# Patient Record
Sex: FEMALE | Race: WHITE | Employment: OTHER | URBAN - METROPOLITAN AREA
[De-identification: names, ages, dates, MRNs, and addresses within clinical notes are randomized per-mention and may not be internally consistent; named-entity substitution may affect disease eponyms.]

---

## 2020-02-07 ENCOUNTER — HOSPITAL ENCOUNTER (EMERGENCY)
Age: 65
Discharge: HOME | End: 2020-02-07
Attending: EMERGENCY MEDICINE
Payer: MEDICARE

## 2020-02-07 ENCOUNTER — APPOINTMENT (OUTPATIENT)
Dept: CT IMAGING | Age: 65
End: 2020-02-07
Payer: MEDICARE

## 2020-02-07 VITALS
HEIGHT: 66 IN | HEART RATE: 69 BPM | OXYGEN SATURATION: 95 % | RESPIRATION RATE: 18 BRPM | BODY MASS INDEX: 24.11 KG/M2 | TEMPERATURE: 98.2 F | DIASTOLIC BLOOD PRESSURE: 63 MMHG | WEIGHT: 150 LBS | SYSTOLIC BLOOD PRESSURE: 125 MMHG

## 2020-02-07 PROCEDURE — 99283 EMERGENCY DEPT VISIT LOW MDM: CPT

## 2020-02-07 PROCEDURE — 70450 CT HEAD/BRAIN W/O DYE: CPT

## 2020-02-07 PROCEDURE — 2500000003 HC RX 250 WO HCPCS: Performed by: PHYSICIAN ASSISTANT

## 2020-02-07 PROCEDURE — 70486 CT MAXILLOFACIAL W/O DYE: CPT

## 2020-02-07 PROCEDURE — 12013 RPR F/E/E/N/L/M 2.6-5.0 CM: CPT

## 2020-02-07 PROCEDURE — 72125 CT NECK SPINE W/O DYE: CPT

## 2020-02-07 PROCEDURE — 6370000000 HC RX 637 (ALT 250 FOR IP): Performed by: PHYSICIAN ASSISTANT

## 2020-02-07 RX ORDER — CEPHALEXIN 500 MG/1
500 CAPSULE ORAL 3 TIMES DAILY
Qty: 29 CAPSULE | Refills: 0 | Status: SHIPPED | OUTPATIENT
Start: 2020-02-07 | End: 2020-02-17

## 2020-02-07 RX ORDER — CEPHALEXIN 250 MG/1
500 CAPSULE ORAL ONCE
Status: COMPLETED | OUTPATIENT
Start: 2020-02-07 | End: 2020-02-07

## 2020-02-07 RX ORDER — ONDANSETRON 4 MG/1
4 TABLET, FILM COATED ORAL ONCE
Status: COMPLETED | OUTPATIENT
Start: 2020-02-07 | End: 2020-02-07

## 2020-02-07 RX ORDER — FLUOXETINE HYDROCHLORIDE 20 MG/1
CAPSULE ORAL
COMMUNITY

## 2020-02-07 RX ORDER — GINSENG 100 MG
CAPSULE ORAL ONCE
Status: COMPLETED | OUTPATIENT
Start: 2020-02-07 | End: 2020-02-07

## 2020-02-07 RX ORDER — LIDOCAINE HYDROCHLORIDE AND EPINEPHRINE 10; 10 MG/ML; UG/ML
20 INJECTION, SOLUTION INFILTRATION; PERINEURAL ONCE
Status: COMPLETED | OUTPATIENT
Start: 2020-02-07 | End: 2020-02-07

## 2020-02-07 RX ADMIN — LIDOCAINE HYDROCHLORIDE AND EPINEPHRINE 20 ML: 10; 10 INJECTION, SOLUTION INFILTRATION; PERINEURAL at 19:25

## 2020-02-07 RX ADMIN — ONDANSETRON HYDROCHLORIDE 4 MG: 4 TABLET, FILM COATED ORAL at 18:12

## 2020-02-07 RX ADMIN — BACITRACIN: 500 OINTMENT TOPICAL at 19:36

## 2020-02-07 RX ADMIN — CEPHALEXIN 500 MG: 250 CAPSULE ORAL at 19:37

## 2020-02-07 ASSESSMENT — ENCOUNTER SYMPTOMS
EYE REDNESS: 0
NAUSEA: 1
BACK PAIN: 0
SORE THROAT: 0
COUGH: 0
ABDOMINAL PAIN: 0
VOMITING: 0
SHORTNESS OF BREATH: 0
EYE DISCHARGE: 0

## 2020-02-07 ASSESSMENT — PAIN DESCRIPTION - PAIN TYPE: TYPE: ACUTE PAIN

## 2020-02-07 ASSESSMENT — PAIN DESCRIPTION - FREQUENCY: FREQUENCY: CONTINUOUS

## 2020-02-07 ASSESSMENT — PAIN SCALES - GENERAL
PAINLEVEL_OUTOF10: 4
PAINLEVEL_OUTOF10: 4

## 2020-02-07 ASSESSMENT — PAIN DESCRIPTION - LOCATION: LOCATION: HEAD

## 2020-02-07 ASSESSMENT — PAIN DESCRIPTION - DESCRIPTORS: DESCRIPTORS: SORE

## 2020-02-07 NOTE — ED PROVIDER NOTES
slow venous oozing. Neurological:      General: No focal deficit present. Mental Status: She is alert and oriented to person, place, and time. Cranial Nerves: Cranial nerves are intact. Sensory: Sensation is intact. Motor: Motor function is intact. Coordination: Finger-Nose-Finger Test normal.      Gait: Gait is intact. Psychiatric:         Mood and Affect: Mood normal.         Behavior: Behavior normal.       DIAGNOSTIC RESULTS     RADIOLOGY:   Radiology images were visualized by myself. The Radiologist interpretations were reviewed and are as follows:     CT HEAD WO CONTRAST (Final result)   Result time 02/07/20 18:44:38   Final result by Nannette Daniels DO (02/07/20 18:44:38)                Impression:    1. No acute intraparenchymal hemorrhage, intra-axial mass, or evidence of an  acute territorial infarct  2. Soft tissue laceration, within the forehead region, with a 4-5 mm  radiopaque foreign body seen within the region of the laceration. Narrative:    EXAMINATION:  CT OF THE HEAD WITHOUT CONTRAST  2/7/2020 6:30 pm    TECHNIQUE:  CT of the head was performed without the administration of intravenous  contrast. Dose modulation, iterative reconstruction, and/or weight based  adjustment of the mA/kV was utilized to reduce the radiation dose to as low  as reasonably achievable. COMPARISON:  None. HISTORY:  ORDERING SYSTEM PROVIDED HISTORY: fall; hit face on the ground; no LOC  TECHNOLOGIST PROVIDED HISTORY:  fall; hit face on the ground; no LOC    Reason for Exam: Pt fell today, multiple facial lacerations  Acuity: Acute  Type of Exam: Initial  Mechanism of Injury: fall today    FINDINGS:  BRAIN/VENTRICLES: There is no acute intracranial hemorrhage, mass effect or  midline shift.  No abnormal extra-axial fluid collection.  The gray-white  differentiation is maintained without evidence of an acute infarct.  There is  no evidence of hydrocephalus.     ORBITS: The visualized portion of the orbits demonstrate no acute abnormality. SINUSES: The visualized paranasal sinuses and mastoid air cells demonstrate  no acute abnormality. SOFT TISSUES/SKULL:  Soft tissue laceration is seen within the forehead  region, slightly left of midline, within the 4-5 mm radiopaque density seen  within the laceration, consistent with a foreign body.                    CT CERVICAL SPINE WO CONTRAST (Final result)   Result time 02/07/20 18:41:22   Final result by Michelle Odom DO (02/07/20 18:41:22)                Impression:    No acute abnormality of the cervical spine. Narrative:    EXAMINATION:  CT OF THE CERVICAL SPINE WITHOUT CONTRAST 2/7/2020 6:30 pm    TECHNIQUE:  CT of the cervical spine was performed without the administration of  intravenous contrast. Multiplanar reformatted images are provided for review. Dose modulation, iterative reconstruction, and/or weight based adjustment of  the mA/kV was utilized to reduce the radiation dose to as low as reasonably  achievable. COMPARISON:  None. HISTORY:  ORDERING SYSTEM PROVIDED HISTORY: fall; hit face on the ground  TECHNOLOGIST PROVIDED HISTORY:  fall; hit face on the ground  Reason for Exam: Pt fell today, multiple facial lacerations  Acuity: Acute  Type of Exam: Initial  Mechanism of Injury: fall today    FINDINGS:  BONES/ALIGNMENT: There is no acute fracture or traumatic malalignment. DEGENERATIVE CHANGES: Cervical spondylosis and disc space narrowing is  present at the C6-C7 disc level, without significant central spinal stenosis    SOFT TISSUES: There is no prevertebral soft tissue swelling.                    CT FACIAL BONES WO CONTRAST (Final result)   Result time 02/07/20 18:56:36   Final result by Suzette Rolon MD (02/07/20 18:56:36)                Impression:    No acute fracture. Skin injury identified forehead/nasal bridge with 3 mm radiopaque foreign  body.             Narrative:    EXAMINATION:  CT OF up-to-date. Given the facial injuries and the fact that the patient hit her head, I will obtain CT scan imaging of the head and cervical spine. I will also obtain a CT scan of the facial bones. Patient is feeling slightly nauseous. Acute nausea will be treated with oral Zofran. EMERGENCY DEPARTMENT COURSE:   Vitals:    Vitals:    02/07/20 1748   BP: (!) 144/85   Pulse: 70   Resp: 16   Temp: 98.2 °F (36.8 °C)   TempSrc: Oral   SpO2: 98%   Weight: 68 kg (150 lb)   Height: 5' 6\" (1.676 m)     -------------------------  BP: (!) 144/85, Temp: 98.2 °F (36.8 °C), Pulse: 70, Resp: 16    The patient was given the following medications:  Orders Placed This Encounter   Medications    ondansetron (ZOFRAN) tablet 4 mg    lidocaine-EPINEPHrine 1 percent-1:020485 injection 20 mL    bacitracin ointment    cephALEXin (KEFLEX) capsule 500 mg    cephALEXin (KEFLEX) 500 MG capsule     Sig: Take 1 capsule by mouth 3 times daily for 10 days     Dispense:  29 capsule     Refill:  0     First dose given in the ER. PROCEDURES  Laceration Repair:  Verbal consent was obtained from from the patient. Laceration description (see physical examination). The laceration was prepped with Betadine and draped in sterile fashion. Anesthesia was achieved with 3.0 cc of percent lidocaine with epinephrine. The wound was irrigated with 120 cc of normal saline and explored. A 3 mm flat gray bubble was removed from the laceration site. The wound edges were reapproximated using 5 simple interrupted 6-0 Ethilon sutures. The patient tolerated the procedure well. No complications. Wound care instructions were discussed with the patient. Antibiotic cream and a dressing were applied to the laceration. Re-evaluation Notes  7:33 PM.  Results of the CT scans were discussed with the patient by myself. The 3 mm foreign body seen in the laceration was removed. Head CT is showing no acute intercranial abnormality.   CT facial bones is showing no evidence of acute fracture. CT scan is showing no acute fracture. Patient will be placed on Keflex. I will provide her with head injury instructions. She should have a wound recheck with her primary care doctor in the next 2 to 3 days. Suture removal in 6 to 7 days. FINAL IMPRESSION      1. Facial laceration, initial encounter    2. Facial abrasion, initial encounter    3.  Head injury, closed, without LOC, initial encounter        DISPOSITION/PLAN   DISPOSITION Discharge - Pending Orders Complete 02/07/2020 07:22:57 PM    Condition on Disposition  Stable    PATIENT REFERRED TO:  Dyana Watts MD  [address] [facility] [address]  [phone]    Schedule an appointment as soon as possible for a visit   For wound re-check in 3-4 days    Dyana Watts MD  [address] [facility] [address]  [phone]    Schedule an appointment as soon as possible for a visit   For suture removal in 6-7 days    DISCHARGE MEDICATIONS:  New Prescriptions    CEPHALEXIN (KEFLEX) 500 MG CAPSULE    Take 1 capsule by mouth 3 times daily for 10 days     (Please note that portions of this note were completed with a voice recognition program.  Efforts were made to edit the dictations but occasionally words are mis-transcribed.)    Hieu Cha PA-C  02/07/20 1958

## 2020-02-07 NOTE — ED PROVIDER NOTES
[id number] [facility]  [address] [facility] [address]  Phone: [phone]  Fax: [phone]      Attending Physician Attestation    I performed a history and physical examination of the patient and discussed management with the mid level provider. I reviewed the mid level provider's note and agree with the documented findings and plan of care. Any areas of disagreement are noted on the chart. I was personally present for the key portions of any procedures. I have documented in the chart those procedures where I was not present during the key portions. I have reviewed the emergency nurses triage note. I agree with the chief complaint, past medical history, past surgical history, allergies, medications, social and family history as documented unless otherwise noted below. Documentation of the HPI, Physical Exam and Medical Decision Making performed by mid level providers is based on my personal performance of the HPI, PE and MDM. For Physician Assistant/ Nurse Practitioner cases/documentation I have personally evaluated this patient and have completed at least one if not all key elements of the E/M (history, physical exam, and MDM). Additional findings are as noted. CHIEF COMPLAINT     No chief complaint on file. HISTORY OF PRESENT ILLNESS    Leandro Irving is a 75 y.o. female who presents after a fall, hit head with laceration to forehead, no other pain, no numbness or weakness, no visual or hearing changes, nausea but no vomiting. Injury occurred just pta      PAST MEDICAL HISTORY    has a past medical history of Depression. SURGICAL HISTORY      has no past surgical history on file. CURRENT MEDICATIONS       Previous Medications    ALENDRONATE SODIUM (FOSAMAX PO)    Take by mouth    FLUOXETINE HCL, PMDD, 20 MG CAPS    Take by mouth       ALLERGIES     has no allergies on file. FAMILY HISTORY     has no family status information on file.       family history is not on file. SOCIAL HISTORY      reports that she has never smoked. She has never used smokeless tobacco. She reports current alcohol use. She reports that she does not use drugs. PHYSICAL EXAM     INITIAL VITALS:  height is 5' 6\" (1.676 m) and weight is 68 kg (150 lb). Her oral temperature is 98.2 °F (36.8 °C). Her blood pressure is 144/85 (abnormal) and her pulse is 70. Her respiration is 16 and oxygen saturation is 98%. The patient is noted to have a laceration to the bridge of the nose there is an abrasion of the left side of the cheek as well as the left lower jaw the pupils are equal round reactive to light no hemotympanum appreciated neck no overt posterior neck pain appreciated chest metrical nontender back symmetrical nontender abdomen soft nontender extremities warm dry good pulses motor sensation throughout no bony point tenderness appreciated in any of the extremities skin we have a laceration of the bridge of the nose abrasions in the facial region otherwise up for the rash or lesions neurologic GCS is 15 and no focal deficits are appreciated      DIAGNOSTIC RESULTS         RADIOLOGY:   Non-plain film images such as CT, Ultrasound and MRI are read by the radiologist. Kassi Cunningham radiographic images are visualized and the radiologist interpretations are reviewed as follows:     CT HEAD WO CONTRAST (Final result)   Result time 02/07/20 18:44:38   Final result by Lauri Weinberg DO (02/07/20 18:44:38)                Impression:    1. No acute intraparenchymal hemorrhage, intra-axial mass, or evidence of an  acute territorial infarct  2. Soft tissue laceration, within the forehead region, with a 4-5 mm  radiopaque foreign body seen within the region of the laceration.             Narrative:    EXAMINATION:  CT OF THE HEAD WITHOUT CONTRAST  2/7/2020 6:30 pm    TECHNIQUE:  CT of the head was performed without the administration of intravenous  contrast. Dose modulation, iterative reconstruction, and/or facial lacerations  Acuity: Acute  Type of Exam: Initial  Mechanism of Injury: fall today    FINDINGS:  BONES/ALIGNMENT: There is no acute fracture or traumatic malalignment. DEGENERATIVE CHANGES: Cervical spondylosis and disc space narrowing is  present at the C6-C7 disc level, without significant central spinal stenosis    SOFT TISSUES: There is no prevertebral soft tissue swelling.                    CT FACIAL BONES WO CONTRAST (Final result)   Result time 02/07/20 18:56:36   Final result by Ade Nieto MD (02/07/20 18:56:36)                Impression:    No acute fracture. Skin injury identified forehead/nasal bridge with 3 mm radiopaque foreign  body. Narrative:    EXAMINATION:  CT OF THE FACE WITHOUT CONTRAST  2/7/2020 6:30 pm    TECHNIQUE:  CT of the face was performed without the administration of intravenous  contrast. Multiplanar reformatted images are provided for review. Dose  modulation, iterative reconstruction, and/or weight based adjustment of the  mA/kV was utilized to reduce the radiation dose to as low as reasonably  achievable. COMPARISON:  None    HISTORY:  ORDERING SYSTEM PROVIDED HISTORY: fall; facial injury  TECHNOLOGIST PROVIDED HISTORY:  fall; facial injury  Reason for Exam: Pt fell today, multiple facial lacerations  Acuity: Acute  Type of Exam: Initial  Mechanism of Injury: fall today    FINDINGS:  FACIAL BONES:  The maxilla, pterygoid plates and zygomatic arches are intact. The mandible is intact.  The mandibular condyles are normally situated.  The  nasal bones and maxillary nasal processes are intact. ORBITS:  The globes appear intact.  The extraocular muscles, optic nerve  sheath complexes and lacrimal glands appear unremarkable.  No retrobulbar  hematoma or mass is seen.  The orbital walls and rims are intact. SINUSES/MASTOIDS:  The paranasal sinuses and mastoid air cells are well  aerated.  No acute fracture is seen.     SOFT TISSUES: Skin laceration identified nasal bridge extending into the  forehead.  Radiopaque foreign body identified on left forehead centrally up  to 3 mm in size. LABS:  No results found for this visit on 02/07/20. EMERGENCY DEPARTMENT COURSE:   Vitals:    Vitals:    02/07/20 1748   BP: (!) 144/85   Pulse: 70   Resp: 16   Temp: 98.2 °F (36.8 °C)   TempSrc: Oral   SpO2: 98%   Weight: 68 kg (150 lb)   Height: 5' 6\" (1.676 m)     -------------------------  BP: (!) 144/85, Temp: 98.2 °F (36.8 °C), Pulse: 70, Resp: 16      PERTINENT ATTENDING PHYSICIAN COMMENTS:    The patient had removal of the foreign body and repair of the laceration by Korene Nissen please refer to her documentation for the procedure note we are recommending that the patient keep the area clean and dry may apply antibiotic ointment return to the ER for any signs of infection such as redness swelling fever drainage or other concerns otherwise to get a wound check by her family doctor within the next few days and the sutures are to be removed in approximately 7 days we will also place the patient on antibiotics      (Please note that portions of this note were completed with a voice recognition program.  Efforts were made to edit the dictations but occasionally words are mis-transcribed.)    Garrido MD, F.A.C.E.P.   Attending Emergency Medicine Physician       Melodie Huffman MD  02/08/20 7280

## 2022-03-08 ENCOUNTER — HOSPITAL ENCOUNTER (OUTPATIENT)
Dept: GENERAL RADIOLOGY | Age: 67
Discharge: HOME | End: 2022-03-10
Payer: MEDICARE

## 2022-03-08 ENCOUNTER — HOSPITAL ENCOUNTER (OUTPATIENT)
Age: 67
Discharge: HOME | End: 2022-03-10
Payer: MEDICARE

## 2022-03-08 DIAGNOSIS — M79.641 HAND PAIN, RIGHT: ICD-10-CM

## 2022-03-08 DIAGNOSIS — M79.641 PAIN IN RIGHT HAND: ICD-10-CM

## 2022-03-08 PROCEDURE — 73130 X-RAY EXAM OF HAND: CPT

## 2022-03-08 PROCEDURE — 73110 X-RAY EXAM OF WRIST: CPT

## 2022-03-15 ENCOUNTER — HOSPITAL ENCOUNTER (OUTPATIENT)
Age: 67
Discharge: HOME | End: 2022-03-17
Payer: MEDICARE

## 2022-03-15 ENCOUNTER — HOSPITAL ENCOUNTER (OUTPATIENT)
Dept: GENERAL RADIOLOGY | Age: 67
Discharge: HOME | End: 2022-03-17
Payer: MEDICARE

## 2022-03-15 DIAGNOSIS — S52.501A CLOSED FRACTURE OF DISTAL END OF RIGHT RADIUS, UNSPECIFIED FRACTURE MORPHOLOGY, INITIAL ENCOUNTER: ICD-10-CM

## 2022-03-15 PROCEDURE — 73110 X-RAY EXAM OF WRIST: CPT
